# Patient Record
Sex: MALE | Race: OTHER | NOT HISPANIC OR LATINO | Employment: PART TIME | URBAN - METROPOLITAN AREA
[De-identification: names, ages, dates, MRNs, and addresses within clinical notes are randomized per-mention and may not be internally consistent; named-entity substitution may affect disease eponyms.]

---

## 2020-11-02 ENCOUNTER — CLINICAL SUPPORT (OUTPATIENT)
Dept: REHABILITATION | Facility: HOSPITAL | Age: 25
End: 2020-11-02
Payer: COMMERCIAL

## 2020-11-02 DIAGNOSIS — R29.3 POSTURE ABNORMALITY: ICD-10-CM

## 2020-11-02 DIAGNOSIS — M54.2 CHRONIC NECK PAIN: ICD-10-CM

## 2020-11-02 DIAGNOSIS — M54.6 CHRONIC BILATERAL THORACIC BACK PAIN: ICD-10-CM

## 2020-11-02 DIAGNOSIS — G89.29 CHRONIC NECK PAIN: ICD-10-CM

## 2020-11-02 DIAGNOSIS — G89.29 CHRONIC BILATERAL LOW BACK PAIN WITH LEFT-SIDED SCIATICA: ICD-10-CM

## 2020-11-02 DIAGNOSIS — G89.29 CHRONIC BILATERAL LOW BACK PAIN WITHOUT SCIATICA: ICD-10-CM

## 2020-11-02 DIAGNOSIS — R29.898 SHOULDER WEAKNESS: ICD-10-CM

## 2020-11-02 DIAGNOSIS — M54.42 CHRONIC BILATERAL LOW BACK PAIN WITH LEFT-SIDED SCIATICA: ICD-10-CM

## 2020-11-02 DIAGNOSIS — G89.29 CHRONIC BILATERAL THORACIC BACK PAIN: ICD-10-CM

## 2020-11-02 DIAGNOSIS — R29.898 WEAKNESS OF BOTH HIPS: ICD-10-CM

## 2020-11-02 DIAGNOSIS — M54.50 CHRONIC BILATERAL LOW BACK PAIN WITHOUT SCIATICA: ICD-10-CM

## 2020-11-02 PROCEDURE — 97162 PT EVAL MOD COMPLEX 30 MIN: CPT | Mod: PO

## 2020-11-02 NOTE — PLAN OF CARE
[facility]  Physical Therapy Initial Evaluation    Name: Jemal Guillen  Clinic Number: [id number]    Therapy Diagnosis:   Encounter Diagnoses   Name Primary?    Chronic bilateral low back pain without sciatica     Chronic bilateral thoracic back pain     Chronic neck pain     Chronic bilateral low back pain with left-sided sciatica     Posture abnormality     Shoulder weakness     Weakness of both hips      Physician: Damir Marti*    Physician Orders: PT Eval and Treat   Medical Diagnosis from Referral: Chronic bilateral low back pain without sciatica   Chronic bilateral thoracic back pain   Chronic neck pain   Evaluation Date: 11/2/2020  Authorization Period Expiration: 10/31/2021  Plan of Care Expiration: 11/02/2020  Visit # / Visits authorized: 1/ 1    Time In: 0740 (patient late)  Time Out: 0815  Total Billable Time: 35 minutes    Precautions: Standard and Seizur    Subjective   Date of onset: Chronic low back pain since his early teens  History of current condition - Jemal reports: He has been having chronic back pain since he was a teen. 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 He states he began wearing brace due to pain recently to help with his posture. He reports numbness and tingling down the (L) arm to the fingers and (L) leg down to the toes. Jemal states his pain is relatively constant, but worse with driving. He reports a history of seizures, weight loss, visual disturbances, and increased urinary frequency.        Medical History:   Past Medical History:   Diagnosis Date    ADHD (attention deficit hyperactivity disorder)     Asthma     GERD (gastroesophageal reflux disease)        Surgical History:   Jemal Guillen  has a past surgical history that includes Tonsillectomy and Urethra  surgery.    Medications:   Jemal has a current medication list which includes the following prescription(s): duloxetine and meloxicam.    Allergies:   Review of patient's allergies indicates:   Allergen Reactions    Nuts [tree nut]      Peanuts        Imaging, X-ray: No abnormal osseous findings  Mild levocurvature may be positional   No abnormal osseous findings   No acute osseous findings or significant degenerative changes      Prior Therapy:  Yes  Social History: Lives with family   Occupation: Drives for 8-12 hours per day  Prior Level of Function: Upper back pain, but able to work  Current Level of Function: Pain with driving, sleeping, lifting, prolonged sitting, unable to perform job duties without significant pain    Pain:  Current 7/10, worst 10/10, best 7/10   Location: left back neck arm leg  Description: Aching, Burning, Throbbing and Shooting  Aggravating Factors: Sitting, Standing, Bending, Walking, Flexing, Lifting and Getting out of bed/chair  Easing Factors: rest and CBD    Pts goals: Less pain, more motion    Red Flag Screening:   Cough  Sneeze  Strain: (+)  Bladder/ bowel: (+) (wakes up more at night to pee)   Falls: (--)  Night pain: (--)  Unexplained weight loss: (+) (MD aware)   General health: Seizures, mitral valve prolapse     Objective     Observation: Patient underweight, wearing a brace    Posture:  Increased thoracic kyphosis, forward head posture    Cervical Range of Motion:    % Limitation Pain   Flexion 20% Mild pain     Extension 20% Neck pain     Right Rotation 40% (B) neck pain     Left Rotation 60% (B) Neck pain        Shoulder Range of Motion:   Shoulder Left Right   Flexion WFL* WFL*   Abduction WFL* WFL*   Flexion + ER Mod limitation* WFL*   Extension + IR WFL* WFL*     *Pain with all shoulder motion    Lumbar Range of Motion:    % Limitation Pain   Flexion 40%   Pain        Extension 75%   Pain        Left rotation   95% Pain        Right Rotation   95% Pain              Lower Extremity Strength  Right LE  Left LE    Knee extension: 4/5 Knee extension: 4/5   Knee flexion: 4/5 Knee flexion: 4/5   Hip flexion: 4+/5 Hip flexion: 4/5   Hip extension:  3/5 Hip extension: 3/5   Hip abduction: 3+/5 Hip abduction: 3+/5   Ankle dorsiflexion: 4/5 Ankle dorsiflexion: 4/5   Ankle plantarflexion: 4/5 Ankle plantarflexion: 4/5     Upper Extremity Strength  (R) UE  (L) UE    Shoulder flexion: 4+/5 Shoulder flexion: 4/5   Shoulder Abduction: 4-/5 Shoulder abduction: 4/5   Shoulder ER 4/5 Shoulder ER 4/5   Shoulder IR 4+/5 Shoulder IR 4+/5   Elbow flexion: 4+/5 Elbow flexion: 4+/5   Elbow extension: 4+/5 Elbow extension: 4+/5   Wrist flexion: 4+/5 Wrist flexion: 4+/5   Wrist extension: 4+/5 Wrist extension: 4+/5    4+/5 : 4+/5       Special Tests:  Distraction Negative   Compression Positive   Spurlings Positive    Sharp-Trav Negative   VA test Negative   Lateral Flexion Alar Ligament Negative       Special Tests:  REPEATED TEST MOVEMENTS:  Repeated Flexion in Standing worse   Repeated Extension in Standing worse   Repeated Flexion in lying worse   Repeated Extension in lying  worse     - Squat: NT  - ALISTAIR Positive (B)   - FADIR Positive on (R) Low back pain  - SCOUR Negative (B)    DTR:   James: Negative (B)       Neuro Dynamic Testing:    Sciatic nerve:      SLR: R = +     L = +       Femoral Nerve:    Femoral nerve test: Negative      Joint Mobility: NT due to time constraints    Palpation: TTP grossly (B) paraspinals, UT, rhomboids    Sensation: Intact to LT C3-T2, L3-S2 but diminished at all locations on (L)     Flexibility: Limited grossly due to pain   Ely's test: R = Severely limited; L = Severely limited   Hamstring: R = Severely limited ; L = Severely limited         CMS Impairment/Limitation/Restriction for FOTO Lumbar Spine  Survey    Therapist reviewed FOTO scores for Jemal Guillen on 11/2/2020.   FOTO documents entered into [employer] - see Media section.    Limitation  Score: 74%  Category: Mobility         TREATMENT     Home Exercises and Patient Education Provided    Education provided:   - Role of PT, PT POC  - Follow up with MD     Written Home Exercises Provided: no.    Assessment   Jemal is a 25 y.o. male referred to outpatient Physical Therapy with a medical diagnosis of Chronic bilateral low back pain without sciatica, Chronic bilateral thoracic back pain, Chronic neck pain. Physical exam is consistent with chronic neck, thoracic, and low back pain. His pain remained relatively constant throughout treatment and he was very limited with all mobility. Primary impairments include postural dysfunction, decreased core, UE strength, and LE strength, decreased cervical, lumbar, UE and LE flexibility and mobility, impaired neuromuscular control of core and hip musculature and pain with functional activities. Unfortunately, he is unable to participate with aquatic exercises due to his history of seizures. Patient with multiple red flag symptoms including seizures, visual disturbances, weight loss, and increased urinary frequency, which he is unsure if he mentioned to MD. Messaged Dr. Marti about the symptoms and will hold treatment until he is seen by Back and Spine this Thursday.     Pt prognosis is Guarded.   Pt will benefit from skilled outpatient Physical Therapy to address the deficits stated above and in the chart below, provide pt/family education, and to maximize pt's level of independence.     Plan of care discussed with patient: Yes  Pt's spiritual, cultural and educational needs considered and patient is agreeable to the plan of care and goals as stated below:     Anticipated Barriers for therapy: Seizures    Medical Necessity is demonstrated by the following  History  Co-morbidities and personal factors that may impact the plan of care Co-morbidities:   History of seizures, mitral valve prolapse    Personal Factors:   age     Moderate    Examination  Body Structures  and Functions, activity limitations and participation restrictions that may impact the plan of care Body Regions:   neck  back  lower extremities  upper extremities  trunk    Body Systems:    ROM  strength  gross coordinated movement  gait  transfers    Participation Restrictions:   Pain with job duties, at rest, with ADLs    Activity limitations:   Learning and applying knowledge  no deficits    General Tasks and Commands  no deficits    Communication  no deficits    Mobility  lifting and carrying objects  moving around using equipment (WC)  driving (bike, car, motorcycle)    Self care  caring for body parts (brushing teeth, shaving, grooming)  dressing    Domestic Life  shopping  cooking  doing house work (cleaning house, washing dishes, laundry)    Interactions/Relationships  no deficits    Life Areas  employment    Community and Social Life  recreation and leisure         moderate   Clinical Presentation evolving clinical presentation with changing clinical characteristics moderate   Decision Making/ Complexity Score: moderate     Goals:   Follow up with back and spine or referring prior to treatment       Plan   Hold PT, follow up with back and spine or referring provider      Homero Santos, PT

## 2020-11-06 ENCOUNTER — TELEPHONE (OUTPATIENT)
Dept: RHEUMATOLOGY | Facility: CLINIC | Age: 25
End: 2020-11-06

## 2020-11-06 NOTE — TELEPHONE ENCOUNTER
----- Message from Dylon Garsia sent at 11/6/2020  7:44 AM CST -----  Regarding: appointment  Contact: self  Type:  Sooner Apoointment Request    Caller is requesting a sooner appointment.  Caller declined first available appointment listed below.  Caller will not accept being placed on the waitlist and is requesting a message be sent to doctor.    Name of Caller:  self   When is the first available appointment? Symptoms:  neck, back pain  Best Call Back Number:  [phone]  Additional Information:  Patient requesting an appointment to be seen for neck and back pain. Patient requesting to see  rheumatologist.

## 2020-11-17 ENCOUNTER — TELEPHONE (OUTPATIENT)
Dept: RHEUMATOLOGY | Facility: CLINIC | Age: 25
End: 2020-11-17

## 2020-11-17 NOTE — TELEPHONE ENCOUNTER
Spoke with patient in reference to New patient schedule for either Dr. Parra or Dr. Castro. He stated it was ok he was looking for something sooner. Offered to give him numbers for other locations. Stated he was driving at the time and he will call back. Let know that he can make an appointment and be placed on waiting list. He declined.

## 2020-12-04 ENCOUNTER — DOCUMENTATION ONLY (OUTPATIENT)
Dept: REHABILITATION | Facility: HOSPITAL | Age: 25
End: 2020-12-04

## 2020-12-04 NOTE — PROGRESS NOTES
Outpatient Therapy Discharge Summary     Name: Jemal Guillen  Clinic Number: [id number]    Therapy Diagnosis:    Chronic bilateral low back pain without sciatica      Chronic bilateral thoracic back pain      Chronic neck pain      Chronic bilateral low back pain with left-sided sciatica      Posture abnormality      Shoulder weakness      Weakness of both hips          Physician: Damir Marti*    Physician Orders: PT Eval and Treat   Medical Diagnosis from Referral: Chronic bilateral low back pain without sciatica   Chronic bilateral thoracic back pain   Chronic neck pain   Evaluation Date: 11/2/2020      Date of Last visit: 11/02/2020  Total Visits Received: 1  Cancelled Visits: 0  No Show Visits: 0    Assessment    None set, patient was following up with back and spine     Discharge reason: Patient has not attended therapy since initial evaluation    Plan   This patient is discharged from Physical Therapy      Homero Santos, PT, DPT  12/04/2020

## 2021-04-29 ENCOUNTER — PATIENT MESSAGE (OUTPATIENT)
Dept: RESEARCH | Facility: HOSPITAL | Age: 26
End: 2021-04-29

## 2021-07-26 ENCOUNTER — IMMUNIZATION (OUTPATIENT)
Dept: FAMILY MEDICINE | Facility: CLINIC | Age: 26
End: 2021-07-26

## 2021-07-26 DIAGNOSIS — Z23 NEED FOR VACCINATION: Primary | ICD-10-CM

## 2021-07-26 PROCEDURE — 0001A COVID-19, MRNA, LNP-S, PF, 30 MCG/0.3 ML DOSE VACCINE: ICD-10-PCS | Mod: CV19,S$GLB,, | Performed by: FAMILY MEDICINE

## 2021-07-26 PROCEDURE — 91300 COVID-19, MRNA, LNP-S, PF, 30 MCG/0.3 ML DOSE VACCINE: ICD-10-PCS | Mod: S$GLB,,, | Performed by: FAMILY MEDICINE

## 2021-07-26 PROCEDURE — 91300 COVID-19, MRNA, LNP-S, PF, 30 MCG/0.3 ML DOSE VACCINE: CPT | Mod: S$GLB,,, | Performed by: FAMILY MEDICINE

## 2021-07-26 PROCEDURE — 0001A COVID-19, MRNA, LNP-S, PF, 30 MCG/0.3 ML DOSE VACCINE: CPT | Mod: CV19,S$GLB,, | Performed by: FAMILY MEDICINE

## 2021-08-16 ENCOUNTER — IMMUNIZATION (OUTPATIENT)
Dept: FAMILY MEDICINE | Facility: CLINIC | Age: 26
End: 2021-08-16
Payer: COMMERCIAL

## 2021-08-16 DIAGNOSIS — Z23 NEED FOR VACCINATION: Primary | ICD-10-CM

## 2021-08-16 PROCEDURE — 91300 COVID-19, MRNA, LNP-S, PF, 30 MCG/0.3 ML DOSE VACCINE: CPT | Mod: S$GLB,,, | Performed by: FAMILY MEDICINE

## 2021-08-16 PROCEDURE — 0002A COVID-19, MRNA, LNP-S, PF, 30 MCG/0.3 ML DOSE VACCINE: CPT | Mod: CV19,S$GLB,, | Performed by: FAMILY MEDICINE

## 2021-08-16 PROCEDURE — 0002A COVID-19, MRNA, LNP-S, PF, 30 MCG/0.3 ML DOSE VACCINE: ICD-10-PCS | Mod: CV19,S$GLB,, | Performed by: FAMILY MEDICINE

## 2021-08-16 PROCEDURE — 91300 COVID-19, MRNA, LNP-S, PF, 30 MCG/0.3 ML DOSE VACCINE: ICD-10-PCS | Mod: S$GLB,,, | Performed by: FAMILY MEDICINE
